# Patient Record
Sex: FEMALE | Race: BLACK OR AFRICAN AMERICAN | NOT HISPANIC OR LATINO | RURAL
[De-identification: names, ages, dates, MRNs, and addresses within clinical notes are randomized per-mention and may not be internally consistent; named-entity substitution may affect disease eponyms.]

---

## 2024-05-30 ENCOUNTER — OFFICE VISIT (OUTPATIENT)
Dept: FAMILY MEDICINE | Facility: CLINIC | Age: 39
End: 2024-05-30
Payer: OTHER GOVERNMENT

## 2024-05-30 VITALS
SYSTOLIC BLOOD PRESSURE: 110 MMHG | OXYGEN SATURATION: 98 % | WEIGHT: 229 LBS | TEMPERATURE: 98 F | DIASTOLIC BLOOD PRESSURE: 70 MMHG | HEART RATE: 81 BPM | BODY MASS INDEX: 38.15 KG/M2 | RESPIRATION RATE: 18 BRPM | HEIGHT: 65 IN

## 2024-05-30 DIAGNOSIS — Z11.3 SCREEN FOR STD (SEXUALLY TRANSMITTED DISEASE): ICD-10-CM

## 2024-05-30 DIAGNOSIS — N89.8 VAGINAL DISCHARGE: Primary | ICD-10-CM

## 2024-05-30 PROBLEM — N87.0 CERVICAL INTRAEPITHELIAL NEOPLASIA GRADE 1: Status: ACTIVE | Noted: 2024-05-30

## 2024-05-30 PROBLEM — N93.9 ABNORMAL UTERINE AND VAGINAL BLEEDING, UNSPECIFIED: Status: ACTIVE | Noted: 2024-05-30

## 2024-05-30 PROBLEM — Z00.00 LABORATORY EXAM ORDERED AS PART OF ROUTINE GENERAL MEDICAL EXAMINATION: Status: ACTIVE | Noted: 2024-05-30

## 2024-05-30 PROBLEM — M54.50 CHRONIC LOW BACK PAIN: Status: ACTIVE | Noted: 2024-05-29

## 2024-05-30 PROBLEM — Z72.51 HIGH RISK SEXUAL BEHAVIOR: Status: ACTIVE | Noted: 2024-05-30

## 2024-05-30 PROBLEM — B35.3 TINEA PEDIS: Status: ACTIVE | Noted: 2024-05-30

## 2024-05-30 PROBLEM — M66.329 NONTRAUMATIC RUPTURE OF TENDONS OF BICEPS (LONG HEAD): Status: ACTIVE | Noted: 2024-05-30

## 2024-05-30 PROBLEM — B35.1 TINEA UNGUIUM: Status: ACTIVE | Noted: 2024-05-30

## 2024-05-30 PROBLEM — R53.82 CHRONIC FATIGUE, UNSPECIFIED: Status: ACTIVE | Noted: 2024-05-30

## 2024-05-30 PROBLEM — E66.3 OVERWEIGHT: Status: ACTIVE | Noted: 2024-05-29

## 2024-05-30 PROBLEM — R87.622 PAPANICOLAOU SMEAR OF VAGINA WITH LOW GRADE SQUAMOUS INTRAEPITHELIAL LESION (LGSIL): Status: ACTIVE | Noted: 2024-05-30

## 2024-05-30 PROBLEM — Z09 ENCOUNTER FOR FOLLOW-UP EXAMINATION AFTER COMPLETED TREATMENT FOR CONDITIONS OTHER THAN MALIGNANT NEOPLASM: Status: ACTIVE | Noted: 2024-02-22

## 2024-05-30 PROBLEM — R87.619 ABNORMAL PAP SMEAR OF CERVIX: Status: ACTIVE | Noted: 2024-05-30

## 2024-05-30 PROBLEM — Z12.4 ENCOUNTER FOR SCREENING FOR MALIGNANT NEOPLASM OF CERVIX: Status: ACTIVE | Noted: 2017-01-12

## 2024-05-30 PROBLEM — M54.50 LOW BACK PAIN: Status: ACTIVE | Noted: 2024-05-30

## 2024-05-30 PROBLEM — B00.9 HERPESVIRAL INFECTION, UNSPECIFIED: Status: ACTIVE | Noted: 2018-07-11

## 2024-05-30 PROBLEM — Z73.3 STRESS, NOT ELSEWHERE CLASSIFIED: Status: ACTIVE | Noted: 2024-05-30

## 2024-05-30 PROBLEM — K21.9 ESOPHAGEAL REFLUX: Status: ACTIVE | Noted: 2024-05-30

## 2024-05-30 PROBLEM — Z11.8 ENCOUNTER FOR SCREENING FOR OTHER INFECTIOUS AND PARASITIC DISEASES: Status: ACTIVE | Noted: 2017-01-12

## 2024-05-30 PROBLEM — J30.9 ALLERGIC RHINITIS: Status: ACTIVE | Noted: 2024-05-30

## 2024-05-30 PROBLEM — Z04.9 OBSERVATION FOR SUSPECTED CONDITION: Status: ACTIVE | Noted: 2024-05-30

## 2024-05-30 PROBLEM — B00.9 HERPES SIMPLEX TYPE 1 INFECTION: Status: ACTIVE | Noted: 2024-05-30

## 2024-05-30 PROBLEM — M54.2 NECK PAIN: Status: ACTIVE | Noted: 2024-05-30

## 2024-05-30 PROBLEM — G47.33 OBSTRUCTIVE SLEEP APNEA: Status: ACTIVE | Noted: 2024-05-30

## 2024-05-30 PROBLEM — R06.02 SHORTNESS OF BREATH: Status: ACTIVE | Noted: 2024-05-30

## 2024-05-30 PROBLEM — Z13.1 ENCOUNTER FOR SCREENING FOR DIABETES MELLITUS: Status: ACTIVE | Noted: 2024-05-30

## 2024-05-30 PROBLEM — Z01.10 OTHER EXAMINATION OF EARS AND HEARING: Status: ACTIVE | Noted: 2024-05-30

## 2024-05-30 PROBLEM — Z02.89 HEALTH EXAMINATION OF DEFINED SUBPOPULATION: Status: ACTIVE | Noted: 2024-05-30

## 2024-05-30 PROBLEM — Z56.9: Status: ACTIVE | Noted: 2024-05-30

## 2024-05-30 PROBLEM — Z71.9: Status: ACTIVE | Noted: 2024-05-30

## 2024-05-30 PROBLEM — E55.9 VITAMIN D DEFICIENCY: Status: ACTIVE | Noted: 2024-05-30

## 2024-05-30 PROBLEM — Z30.9 CONTRACEPTIVE MANAGEMENT: Status: ACTIVE | Noted: 2024-05-30

## 2024-05-30 PROBLEM — Z11.51 ENCOUNTER FOR SCREENING FOR HUMAN PAPILLOMAVIRUS (HPV): Status: ACTIVE | Noted: 2017-01-12

## 2024-05-30 PROBLEM — R87.810 HIGH-RISK HUMAN PAPILLOMAVIRUS (HPV) DNA DETECTED IN CERVICAL SPECIMEN: Status: ACTIVE | Noted: 2024-05-30

## 2024-05-30 PROBLEM — R87.612 LOW GRADE SQUAMOUS INTRAEPITHELIAL LESION (LGSIL) ON PAPANICOLAOU SMEAR OF CERVIX: Status: ACTIVE | Noted: 2024-05-30

## 2024-05-30 PROBLEM — G89.29 CHRONIC LOW BACK PAIN: Status: ACTIVE | Noted: 2024-05-29

## 2024-05-30 PROBLEM — Z71.9 PATIENT COUNSELED: Status: ACTIVE | Noted: 2024-02-22

## 2024-05-30 PROBLEM — E66.9 OBESITY: Status: ACTIVE | Noted: 2024-05-30

## 2024-05-30 PROBLEM — K20.90 ESOPHAGITIS: Status: ACTIVE | Noted: 2024-05-30

## 2024-05-30 LAB
BILIRUB SERPL-MCNC: NEGATIVE MG/DL
BLOOD URINE, POC: NEGATIVE
CANDIDA SPECIES: POSITIVE
COLOR, POC UA: YELLOW
GARDNERELLA: POSITIVE
GLUCOSE UR QL STRIP: NEGATIVE
HIV 1+O+2 AB SERPL QL: NORMAL
KETONES UR QL STRIP: NEGATIVE
LEUKOCYTE ESTERASE URINE, POC: NEGATIVE
NITRITE, POC UA: NEGATIVE
PH, POC UA: 7.5
PROTEIN, POC: NEGATIVE
SPECIFIC GRAVITY, POC UA: 1.02
SYPHILIS AB INTERPRETATION: NORMAL
TRICHOMONAS: NEGATIVE
UROBILINOGEN, POC UA: 1

## 2024-05-30 PROCEDURE — 86694 HERPES SIMPLEX NES ANTBDY: CPT | Mod: ,,, | Performed by: CLINICAL MEDICAL LABORATORY

## 2024-05-30 PROCEDURE — 99204 OFFICE O/P NEW MOD 45 MIN: CPT | Mod: ,,, | Performed by: NURSE PRACTITIONER

## 2024-05-30 PROCEDURE — 87491 CHLMYD TRACH DNA AMP PROBE: CPT | Mod: ,,, | Performed by: CLINICAL MEDICAL LABORATORY

## 2024-05-30 PROCEDURE — 87660 TRICHOMONAS VAGIN DIR PROBE: CPT | Mod: ,,, | Performed by: CLINICAL MEDICAL LABORATORY

## 2024-05-30 PROCEDURE — 87510 GARDNER VAG DNA DIR PROBE: CPT | Mod: ,,, | Performed by: CLINICAL MEDICAL LABORATORY

## 2024-05-30 PROCEDURE — 87480 CANDIDA DNA DIR PROBE: CPT | Mod: ,,, | Performed by: CLINICAL MEDICAL LABORATORY

## 2024-05-30 PROCEDURE — 86695 HERPES SIMPLEX TYPE 1 TEST: CPT | Mod: ,,, | Performed by: CLINICAL MEDICAL LABORATORY

## 2024-05-30 PROCEDURE — 86780 TREPONEMA PALLIDUM: CPT | Mod: ,,, | Performed by: CLINICAL MEDICAL LABORATORY

## 2024-05-30 PROCEDURE — 86696 HERPES SIMPLEX TYPE 2 TEST: CPT | Mod: ,,, | Performed by: CLINICAL MEDICAL LABORATORY

## 2024-05-30 PROCEDURE — 81003 URINALYSIS AUTO W/O SCOPE: CPT | Mod: QW,,, | Performed by: NURSE PRACTITIONER

## 2024-05-30 PROCEDURE — 87591 N.GONORRHOEAE DNA AMP PROB: CPT | Mod: ,,, | Performed by: CLINICAL MEDICAL LABORATORY

## 2024-05-30 PROCEDURE — 87389 HIV-1 AG W/HIV-1&-2 AB AG IA: CPT | Mod: ,,, | Performed by: CLINICAL MEDICAL LABORATORY

## 2024-05-30 RX ORDER — IBUPROFEN 800 MG/1
800 TABLET ORAL 3 TIMES DAILY
COMMUNITY
Start: 2024-04-01

## 2024-05-30 RX ORDER — NALTREXONE HYDROCHLORIDE AND BUPROPION HYDROCHLORIDE 8; 90 MG/1; MG/1
TABLET, EXTENDED RELEASE ORAL
COMMUNITY
Start: 2024-05-29

## 2024-05-30 RX ORDER — FLUCONAZOLE 150 MG/1
TABLET ORAL
Qty: 2 TABLET | Refills: 0 | Status: SHIPPED | OUTPATIENT
Start: 2024-05-30

## 2024-05-30 RX ORDER — METRONIDAZOLE 500 MG/1
500 TABLET ORAL 2 TIMES DAILY
Qty: 14 TABLET | Refills: 0 | Status: SHIPPED | OUTPATIENT
Start: 2024-05-30 | End: 2024-06-06

## 2024-05-30 NOTE — PROGRESS NOTES
"Subjective:       Patient ID: Veronica Lee is a 38 y.o. female.    Chief Complaint: Vaginal Discharge (X5 Days - Milky White - Itchy - Irregular Odor - Requesting STI request)    Presents to clinic as above. Vaginal discharge with slight odor. No itching. No dysuria. Wants full STD checkup.       Review of Systems   Constitutional: Negative.    Respiratory: Negative.     Cardiovascular: Negative.    Gastrointestinal:  Negative for abdominal pain, constipation and diarrhea.   Genitourinary:  Negative for dysuria, flank pain, frequency, hematuria and urgency.          Reviewed family, medical, surgical, and social history.    Objective:      /70 (BP Location: Right arm, Patient Position: Sitting, BP Method: Large (Automatic))   Pulse 81   Temp 98.2 °F (36.8 °C) (Oral)   Resp 18   Ht 5' 5" (1.651 m)   Wt 103.9 kg (229 lb)   LMP 05/14/2024 (Exact Date)   SpO2 98%   BMI 38.11 kg/m²   Physical Exam  Vitals and nursing note reviewed.   Constitutional:       General: She is not in acute distress.     Appearance: Normal appearance. She is not ill-appearing, toxic-appearing or diaphoretic.   HENT:      Head: Normocephalic.      Mouth/Throat:      Mouth: Mucous membranes are moist.   Cardiovascular:      Rate and Rhythm: Normal rate and regular rhythm.      Heart sounds: Normal heart sounds.   Pulmonary:      Effort: Pulmonary effort is normal.      Breath sounds: Normal breath sounds.   Musculoskeletal:      Cervical back: Normal range of motion and neck supple.   Skin:     General: Skin is warm and dry.      Capillary Refill: Capillary refill takes less than 2 seconds.   Neurological:      Mental Status: She is alert and oriented to person, place, and time.   Psychiatric:         Mood and Affect: Mood normal.         Behavior: Behavior normal.         Thought Content: Thought content normal.         Judgment: Judgment normal.            Office Visit on 05/30/2024   Component Date Value Ref Range Status    " Color, UA 05/30/2024 Yellow   Final    Spec Grav UA 05/30/2024 1.020   Final    pH, UA 05/30/2024 7.5   Final    WBC, UA 05/30/2024 negative   Final    Nitrite, UA 05/30/2024 negative   Final    Protein, POC 05/30/2024 negative   Final    Glucose, UA 05/30/2024 negative   Final    Ketones, UA 05/30/2024 negative   Final    Bilirubin, POC 05/30/2024 negative   Final    Urobilinogen, UA 05/30/2024 1.0   Final    Blood, UA 05/30/2024 negative   Final      Assessment:       1. Vaginal discharge    2. Screen for STD (sexually transmitted disease)        Plan:       Vaginal discharge  -     POCT URINALYSIS W/O SCOPE  -     Bacterial Vaginosis; Future; Expected date: 05/30/2024  -     Chlamydia/GC, PCR; Future; Expected date: 05/30/2024  -     Syphilis Antibody with reflex to RPR; Future; Expected date: 05/30/2024  -     HSV 1 & 2, IgG; Future; Expected date: 05/30/2024  -     HSV 1 & 2, IgM; Future; Expected date: 05/30/2024  -     HIV 1/2 Ag/Ab (4th Gen); Future; Expected date: 05/30/2024  -     metroNIDAZOLE (FLAGYL) 500 MG tablet; Take 1 tablet (500 mg total) by mouth 2 (two) times a day. for 7 days  Dispense: 14 tablet; Refill: 0  -     fluconazole (DIFLUCAN) 150 MG Tab; Take 1 po today and repeat in 5 days.  Dispense: 2 tablet; Refill: 0    Screen for STD (sexually transmitted disease)  -     Bacterial Vaginosis; Future; Expected date: 05/30/2024  -     Chlamydia/GC, PCR; Future; Expected date: 05/30/2024  -     Syphilis Antibody with reflex to RPR; Future; Expected date: 05/30/2024  -     HSV 1 & 2, IgG; Future; Expected date: 05/30/2024  -     HSV 1 & 2, IgM; Future; Expected date: 05/30/2024  -     HIV 1/2 Ag/Ab (4th Gen); Future; Expected date: 05/30/2024    I will call with lab results  RTC PRN          Risks, benefits, and side effects were discussed with the patient. All questions were answered to the fullest satisfaction of the patient, and pt verbalized understanding and agreement to treatment plan. Pt was  to call with any new or worsening symptoms, or present to the ER.

## 2024-05-31 LAB
CHLAMYDIA BY PCR: NEGATIVE
N. GONORRHOEAE (GC) BY PCR: NEGATIVE

## 2024-06-04 LAB
HSV IGM SER QL IA: NEGATIVE
HSV TYPE 1 AB IGG INDEX: 8.56
HSV TYPE 2 AB IGG INDEX: 9.23
HSV1 IGG SER QL: POSITIVE
HSV2 IGG SER QL: POSITIVE

## 2024-09-02 PROBLEM — Z13.1 ENCOUNTER FOR SCREENING FOR DIABETES MELLITUS: Status: RESOLVED | Noted: 2024-05-30 | Resolved: 2024-09-02

## 2024-09-02 PROBLEM — Z00.00 LABORATORY EXAM ORDERED AS PART OF ROUTINE GENERAL MEDICAL EXAMINATION: Status: RESOLVED | Noted: 2024-05-30 | Resolved: 2024-09-02

## 2024-09-02 PROBLEM — Z09 ENCOUNTER FOR FOLLOW-UP EXAMINATION AFTER COMPLETED TREATMENT FOR CONDITIONS OTHER THAN MALIGNANT NEOPLASM: Status: RESOLVED | Noted: 2024-02-22 | Resolved: 2024-09-02

## 2024-09-19 ENCOUNTER — OFFICE VISIT (OUTPATIENT)
Dept: DERMATOLOGY | Facility: CLINIC | Age: 39
End: 2024-09-19
Payer: OTHER GOVERNMENT

## 2024-09-19 VITALS — HEIGHT: 65 IN | BODY MASS INDEX: 38.16 KG/M2 | WEIGHT: 229.06 LBS | RESPIRATION RATE: 18 BRPM

## 2024-09-19 DIAGNOSIS — L72.0 EPIDERMAL CYST: Primary | ICD-10-CM

## 2024-09-19 NOTE — PROGRESS NOTES
[facility]   Viky Duran MD    Patient Name: Veronica Lee  Patient YOB: 1985   Date of Service: 9/19/24    CC: Cyst    HPI: Veronica Lee is a 39 y.o. female here today for cyst, located on the groin.  Cyst has been present for 1 years.  Previous treatments include no treatment.      History reviewed. No pertinent past medical history.  History reviewed. No pertinent surgical history.  Review of patient's allergies indicates:  No Known Allergies    Current Outpatient Medications:     fluconazole (DIFLUCAN) 150 MG Tab, Take 1 po today and repeat in 5 days., Disp: 2 tablet, Rfl: 0    ibuprofen (ADVIL,MOTRIN) 800 MG tablet, Take 800 mg by mouth 3 (three) times daily., Disp: , Rfl:     naltrexone-bupropion (CONTRAVE) 8-90 mg TbSR, See Instructions, 1 tab every morning x1 week, then 1 tab twice daily x1 week, then 2 tabs by mouth in the morning, 1 tab in the afternoon x1 week., # 90 tab(s), 0 total refill(s), Maintenance, Patient's Home Address: ; [address] [place] [address] [place] [address]; Phone: [phone], 1 tab every morning x1 week, then 1 tab twice daily x1 week, then 2 tabs by mouth in the morning, 1 tab in the afternoon x1 week., Pharmacy: [email or web address], Disp: , Rfl:     omeprazole magnesium (PRILOSEC OTC ORAL), Take 20 mg by mouth., Disp: , Rfl:     ROS: A focused review of systems was obtained and negative.     Exam: A focused skin exam was performed. All areas examined were normal except as mentioned in the assessment and plan below.  General Appearance of the patient is well developed and well nourished.  Orientation: alert and oriented x 3.  Mood and affect: pleasant.    Assessment:   The encounter diagnosis was Epidermal cyst.    Plan:      Epidermal Cyst  - subcutaneous cyst with prominent follicular pore located on the mons pubis    Plan: Counseling  I counseled the patient regarding the following:  Skin Care: Epidermal Cysts require no specific skin  care.  Expectations: Epidermal Cysts are benign sacs within the skin that contain keratin.  Contact Office if: Epidermal Cysts rupture or become red and tender.    - Excision scheduled for 10/1/2024 @8:30 am, expectations discussed     Follow up if symptoms worsen or fail to improve.    Viky Duran MD

## 2024-10-01 ENCOUNTER — PROCEDURE VISIT (OUTPATIENT)
Dept: DERMATOLOGY | Facility: CLINIC | Age: 39
End: 2024-10-01
Payer: OTHER GOVERNMENT

## 2024-10-01 VITALS
RESPIRATION RATE: 18 BRPM | SYSTOLIC BLOOD PRESSURE: 114 MMHG | WEIGHT: 229.06 LBS | HEART RATE: 97 BPM | BODY MASS INDEX: 38.16 KG/M2 | DIASTOLIC BLOOD PRESSURE: 71 MMHG | HEIGHT: 65 IN

## 2024-10-01 DIAGNOSIS — L72.0 EPIDERMAL CYST: Primary | ICD-10-CM

## 2024-10-01 PROCEDURE — 12042 INTMD RPR N-HF/GENIT2.6-7.5: CPT | Mod: ,,, | Performed by: DERMATOLOGY

## 2024-10-01 PROCEDURE — 99499 UNLISTED E&M SERVICE: CPT | Mod: ,,, | Performed by: DERMATOLOGY

## 2024-10-01 PROCEDURE — 88304 TISSUE EXAM BY PATHOLOGIST: CPT | Mod: TC,SUR | Performed by: DERMATOLOGY

## 2024-10-01 PROCEDURE — 88304 TISSUE EXAM BY PATHOLOGIST: CPT | Mod: 26,,, | Performed by: PATHOLOGY

## 2024-10-01 PROCEDURE — 11422 EXC H-F-NK-SP B9+MARG 1.1-2: CPT | Mod: 51,,, | Performed by: DERMATOLOGY

## 2024-10-01 RX ORDER — CEPHALEXIN 500 MG/1
CAPSULE ORAL
Qty: 10 CAPSULE | Refills: 0 | Status: SHIPPED | OUTPATIENT
Start: 2024-10-01

## 2024-10-01 RX ORDER — MUPIROCIN 20 MG/G
OINTMENT TOPICAL
Qty: 30 G | Refills: 1 | Status: SHIPPED | OUTPATIENT
Start: 2024-10-01

## 2024-10-01 NOTE — PROGRESS NOTES
[facility]   Viky Duran MD    Patient Name: Veronica Lee  Patient YOB: 1985   Date of Service: 10/1/24    CC: Excision    HPI: Veronica Lee is a 39 y.o. female here today for excision of cyst, located on the mons pubis.      History reviewed. No pertinent past medical history.  History reviewed. No pertinent surgical history.  Review of patient's allergies indicates:  No Known Allergies    Current Outpatient Medications:     cephALEXin (KEFLEX) 500 MG capsule, Take one 500mg capsule PO BID x5 days, Disp: 10 capsule, Rfl: 0    fluconazole (DIFLUCAN) 150 MG Tab, Take 1 po today and repeat in 5 days., Disp: 2 tablet, Rfl: 0    ibuprofen (ADVIL,MOTRIN) 800 MG tablet, Take 800 mg by mouth 3 (three) times daily., Disp: , Rfl:     mupirocin (BACTROBAN) 2 % ointment, Apply to excision site TID, Disp: 30 g, Rfl: 1    naltrexone-bupropion (CONTRAVE) 8-90 mg TbSR, See Instructions, 1 tab every morning x1 week, then 1 tab twice daily x1 week, then 2 tabs by mouth in the morning, 1 tab in the afternoon x1 week., # 90 tab(s), 0 total refill(s), Maintenance, Patient's Home Address: ; [address] [place] [address] [place] [address]; Phone: [phone], 1 tab every morning x1 week, then 1 tab twice daily x1 week, then 2 tabs by mouth in the morning, 1 tab in the afternoon x1 week., Pharmacy: [facility], Disp: , Rfl:     omeprazole magnesium (PRILOSEC OTC ORAL), Take 20 mg by mouth., Disp: , Rfl:     ROS: A focused review of systems was obtained and negative.     Exam: A focused skin exam was performed and the biopsy site was identified.  General Appearance of the patient is well developed and well nourished.  Orientation: alert and oriented x 3.  Mood and affect: pleasant.    Vitals:    10/01/24 0840   BP: 114/71   Pulse: 97   Resp: 18       Assessment:   The encounter diagnosis was Epidermal cyst.    Plan:  Excision    Biopsy Photograph Reviewed: Yes    Procedure Note    Location (A): Mons  Pubis  Preop Size: 1.0 x 1.4  Margin: 0 cm  Total Excised Diameter: 1.0 x 1.4 cm  Procedure: Excision - Elliptical  Anesthesia: local infiltration-1% lidocaine with epinephrine (12 cc)  Estimated Blood Loss: minimal  Complications: none    Repair Type: Intermediate  Repair Length: 3    Consent was obtained from the patient. The risks and benefits to therapy were discussed in detail. Specifically, the risks of infection, scarring, bleeding, prolonged wound healing, incomplete removal, allergy to anesthesia, nerve injury and recurrence were addressed. Prior to the procedure, the treatment site was clearly identified and confirmed by the patient. All components of Universal Protocol/PAUSE Rule completed.    Procedure: The patient was placed in a comfortable position exposing the surgical site. The area was prepped with Hibiclens and draped in the usual fashion. An elliptical excision was performed, on the above listed location The margin was drawn around the clinically apparent lesion. An elliptical shape was then drawn on the skin incorporating the lesion and margins. Incisions were then made along these lines to the appropriate tissue plane and the lesion was extirpated. A 15 blade was used. The lesion was excised to the layer of the adipose tissue, removed and sent to pathology for processing and histologic evaluation.    Intermediate layered repair was performed because closure of the subcutaneous tissue and superficial non-muscular fascia was required, because damaged skin surrounding defect made closure difficult, because extensive undermining required, and because Burow's triangles were required. Undermining was performed with blunt dissection. Hemostasis was achieved with electrocautery. The subcutaneous tissue and dermis were closed with 3-0 Vicryl (interrupted). Epidermal closure was achieved with 4-0 Ethilon(interrupted). Petrolatum + dry sterile dressing were applied. I reviewed with the patient in detail  post-care instructions. Patient is not to engage in any heavy lifting, exercise, or swimming for the next 14 days. Should the patient develop any fevers, chills, bleeding, severe pain patient will contact the office immediately. Suture removal in 10.  - Will send in Mupirocin and Keflex 500mg BID x days  Medications Ordered This Encounter   Medications    cephALEXin (KEFLEX) 500 MG capsule     Sig: Take one 500mg capsule PO BID x5 days     Dispense:  10 capsule     Refill:  0    mupirocin (BACTROBAN) 2 % ointment     Sig: Apply to excision site TID     Dispense:  30 g     Refill:  1         Follow up in about 10 days (around 10/11/2024) for Suture Removal .    Viky Duran MD

## 2024-10-03 LAB
ESTROGEN SERPL-MCNC: NORMAL PG/ML
INSULIN SERPL-ACNC: NORMAL U[IU]/ML
LAB AP GROSS DESCRIPTION: NORMAL
LAB AP LABORATORY NOTES: NORMAL
LAB AP SPEC A DDX: NORMAL
LAB AP SPEC A MORPHOLOGY: NORMAL
LAB AP SPEC A PROCEDURE: NORMAL
T3RU NFR SERPL: NORMAL %

## 2024-10-14 ENCOUNTER — CLINICAL SUPPORT (OUTPATIENT)
Dept: DERMATOLOGY | Facility: CLINIC | Age: 39
End: 2024-10-14
Payer: OTHER GOVERNMENT

## 2024-10-14 VITALS — HEIGHT: 65 IN | BODY MASS INDEX: 38.16 KG/M2 | RESPIRATION RATE: 18 BRPM | WEIGHT: 229.06 LBS

## 2024-10-14 DIAGNOSIS — Z48.02 ENCOUNTER FOR REMOVAL OF SUTURES: Primary | ICD-10-CM

## 2024-10-14 NOTE — PROGRESS NOTES
[facility]   Viky Duran MD    Patient Name: Veronica Lee  Patient YOB: 1985   Date of Service: 10/14/24    CC: Suture removal    HPI: Veronica Lee is a 39 y.o. female here today for suture removal on the mons pubis .  The area is healing well.  Patient denies fever, chills, puss, or redness to the area.    No past medical history on file.  No past surgical history on file.  Review of patient's allergies indicates:  No Known Allergies    Current Outpatient Medications:     cephALEXin (KEFLEX) 500 MG capsule, Take one 500mg capsule PO BID x5 days, Disp: 10 capsule, Rfl: 0    fluconazole (DIFLUCAN) 150 MG Tab, Take 1 po today and repeat in 5 days., Disp: 2 tablet, Rfl: 0    ibuprofen (ADVIL,MOTRIN) 800 MG tablet, Take 800 mg by mouth 3 (three) times daily., Disp: , Rfl:     mupirocin (BACTROBAN) 2 % ointment, Apply to excision site TID, Disp: 30 g, Rfl: 1    naltrexone-bupropion (CONTRAVE) 8-90 mg TbSR, See Instructions, 1 tab every morning x1 week, then 1 tab twice daily x1 week, then 2 tabs by mouth in the morning, 1 tab in the afternoon x1 week., # 90 tab(s), 0 total refill(s), Maintenance, Patient's Home Address: ; [address] [place] [address] [place] [address]; Phone: [phone], 1 tab every morning x1 week, then 1 tab twice daily x1 week, then 2 tabs by mouth in the morning, 1 tab in the afternoon x1 week., [facility], Disp: , Rfl:     omeprazole magnesium (PRILOSEC OTC ORAL), Take 20 mg by mouth., Disp: , Rfl:       Exam: A focused skin exam was performed. All areas examined were normal except as mentioned in the assessment and plan below.  General Appearance of the patient is well developed and well nourished.  Orientation: alert and oriented x 3.  Mood and affect: pleasant.    Assessment:   The encounter diagnosis was Encounter for removal of sutures.    Plan:   Suture Removal (Global Period)  Body Locations: Mons Pubis   I reviewed the pathology results with  the patient in detail.  The examination of the site was clean, dry and intact. Sutures were removed.  Vaseline applied.    I counseled the patient regarding the following:  Expectations: Sutured wounds tend to have 50% of the strength of normal skin at the time of suture removal. Try avoiding rigorous exercise or lifting greater than 10 pounds.  Contact office if: you develop pain, redness, tenderness or pus at the surgical site.    A culture was not obtained from the area           No follow-ups on file.    Lucila Morgan LPN